# Patient Record
Sex: MALE | Race: OTHER | HISPANIC OR LATINO | URBAN - METROPOLITAN AREA
[De-identification: names, ages, dates, MRNs, and addresses within clinical notes are randomized per-mention and may not be internally consistent; named-entity substitution may affect disease eponyms.]

---

## 2024-07-14 ENCOUNTER — EMERGENCY (EMERGENCY)
Facility: HOSPITAL | Age: 24
LOS: 1 days | Discharge: HOME | End: 2024-07-14
Attending: EMERGENCY MEDICINE
Payer: COMMERCIAL

## 2024-07-14 VITALS
WEIGHT: 160.06 LBS | TEMPERATURE: 99 F | SYSTOLIC BLOOD PRESSURE: 115 MMHG | DIASTOLIC BLOOD PRESSURE: 64 MMHG | RESPIRATION RATE: 16 BRPM | OXYGEN SATURATION: 97 % | HEART RATE: 116 BPM | HEIGHT: 68 IN

## 2024-07-14 VITALS
SYSTOLIC BLOOD PRESSURE: 115 MMHG | HEART RATE: 90 BPM | OXYGEN SATURATION: 98 % | RESPIRATION RATE: 18 BRPM | TEMPERATURE: 98 F | DIASTOLIC BLOOD PRESSURE: 84 MMHG

## 2024-07-14 PROCEDURE — 71045 X-RAY EXAM CHEST 1 VIEW: CPT | Mod: 26

## 2024-07-14 PROCEDURE — 99283 EMERGENCY DEPT VISIT LOW MDM: CPT | Mod: 25

## 2024-07-14 PROCEDURE — 71045 X-RAY EXAM CHEST 1 VIEW: CPT

## 2024-07-14 PROCEDURE — 99284 EMERGENCY DEPT VISIT MOD MDM: CPT

## 2024-07-14 RX ORDER — SODIUM CHLORIDE 0.9 % (FLUSH) 0.9 %
1000 SYRINGE (ML) INJECTION ONCE
Refills: 0 | Status: COMPLETED | OUTPATIENT
Start: 2024-07-14 | End: 2024-07-14

## 2024-07-14 RX ADMIN — Medication 1000 MILLILITER(S): at 18:17

## 2024-07-14 NOTE — ED ADULT NURSE NOTE - SUICIDE SCREENING QUESTION 2
[facility] Care Coordination Contact      [facility] Six-Month Telephone Assessment    6 month telephone assessment completed on 5/12/21.    ER visits: No  Hospitalizations: No  TCU stays: No  Significant health status changes: Reported her pain is better managed. Discussed rt TENS unit not covered under [place] d/t member does not have qualifying diagnosis. Recommended to check online.   Falls/Injuries: No  ADL/IADL changes: No  Changes in services: No    Caregiver Assessment follow up: Has daughter for informal support.     Goals: See POC in chart for goal progress documentation.      Will see member in 6 months for an annual health risk assessment.   Encouraged member to call CC with any questions or concerns in the meantime.       Dileep Jorge RN Care Coordinator  [facility]  Office: [phone]  Fax: [phone]  [email or web address]                 No
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
## 2024-07-14 NOTE — ED PROVIDER NOTE - NS ED MD DISPO DISCHARGE CCDA
Visit Summary for NIYA LOPEZ - Gender: Male - Date of Birth: [id number]  Date: [id number] - Duration: 2 minutes  Patient: Coco Hansen   [address]  Provider: Howie Gayle PA-C    Patient Contact Information  Address  [address] [employer]  [place]; [address]  [phone]    Visit Topics  Comp spine acute LBP [Added By: Self - 2019-11-27]    Conversation Transcripts  [0A][0A] [Notification] You are connected with Howie Gayle PA-C, Urgent Care Specialist [0A][Notification] Kirsten Lackey is located in [place]  [0A][Notification] Kirsten Lackey has shared health history  Mane Jara  [0A]    Diagnosis    Procedures  Value: [id number] Code: CPT-4 UNLISTED E&M SERVICE    Medications Prescribed    No prescriptions ordered    Electronically signed by: Abdi Garsia PA-C(NPI [id number]) Patient/Caregiver provided printed discharge information.
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
## 2024-07-14 NOTE — ED PROVIDER NOTE - WET READ LAUNCH FT
EEG completed, report to follow.   Jaky Martino 7/5/2018 11:28 AM There are no Wet Read(s) to document.

## 2024-07-14 NOTE — ED PROVIDER NOTE - PATIENT PORTAL LINK FT
You can access the FollowMyHealth Patient Portal offered by [facility] by registering at the following website: [email or web address]. By joining [employer]’s FollowMyHealth portal, you will also be able to view your health information using other applications (apps) compatible with our system.

## 2024-07-14 NOTE — ED PROVIDER NOTE - PROGRESS NOTE DETAILS
Gina PIERRE: Received patient in signout pending [employer].  Vitals improved, chest x-ray within normal limits.  Patient feeling better.  Ready for discharge.
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
## 2024-07-14 NOTE — ED PROVIDER NOTE - NSFOLLOWUPINSTRUCTIONS_ED_ALL_ED_FT
increase fluid intake  follow up with doctor in 3 - 5 days increase fluid intake  follow up with doctor in 3 - 5 days    Nonfatal Drowning  Nonfatal drowning happens when you cannot breathe normally after being underwater or after breathing water into your lungs. Drowning stops you from getting enough oxygen.    Nonfatal drowning can be life-threatening. It can also cause lung damage, brain damage, and other serious medical problems. Symptoms of drowning can start right away or hours later. Still, many people do not get serious medical problems from drowning.    What are the causes?  Drowning may be caused by:  Panicking and holding your breath when you are underwater.  Breathing in water by accident.  Tightening (spasm) of muscles used for breathing.  What increases the risk?  Being younger than age 4 or older than age 75.  Not being able to swim or not being used to being in water.  Not having been in a boat often in the past.  Drinking alcohol near water.  Using certain drugs or medicines near water.  Having problems with your heart, lungs, or nervous system.  Having an accident or getting hurt in or near water.  Spending time in water in spring or summer, or on weekends.  What are the signs or symptoms?  Symptoms may include:  Coughing or trouble breathing. You may make high-pitched whistling sounds when you breathe (wheeze).  Injured neck, if you get hurt from a dive or a fall.  Chest pain.  Vomiting.  Being very tired (fatigue).  Skin turning cold, blue, or pale.  Feeling mixed up (confused).  Seizure.  Coma. This means being deeply unconscious.  All of these problems can lead to:  Sudden stopping of the heart.  Stopping of breathing.  Seizure.  Coma.  Lasting problems with the nervous system.  How is this treated?  Rescue breathing or CPR.  Breathing support.  Antibiotic medicines to prevent or treat lung infection (pneumonia).  Fluids through an IV tube.  Careful watching, if your symptoms are not serious.  Treatment or a stay at a hospital, if your symptoms are serious. Symptoms include:  Shortness of breath.  Vomiting.  Trouble staying awake.  Loss of consciousness.  Coma.  Sudden stopping of the heart.  Not being able to breathe.  Follow these instructions at home:  Take over-the-counter and prescription medicines only as told by your doctor.  If you were given an antibiotic medicine, take it as told by your doctor. Do not stop taking it even if you start to feel better.  Keep all follow-up visits.  How is this prevented?  Swimming safety      Learn to swim, if you cannot swim.  Do not do dangerous or risky activities near water.  Do not scuba-dive or swim in open ocean.  Swim only where lifeguards are around.  Do not swim alone. Always swim with a friend or family member.  Do not use foam toys or toys filled with air. These include water wings, foam noodles, and inner tubes. Use a life jacket instead.  Before you jump or dive into water, find out how deep the water is.  Avoid swimming in places where you do not know if the water is safe. Watch for currents, wildlife, rocks, and other dangers in bodies of water.  Put a fence around home pools, including any of these:  In-ground pools.  Above-ground pools.  Inflatable pools.  Portable pools.  General water safety      Always watch children around water. Stay within reach when they are near or in pools, lakes, bathtubs, or buckets with water.  Always wear a life jacket when you go on a boat.  Do not drink alcohol or use drugs when you are near bodies of water.  Do not walk, skate, or ride on thin or thawing ice in winter.  Contact a doctor if:  You have a fever.  You have a cough that will not go away.  Get help right away if:  You have trouble breathing.  You start to vomit.  You start to make high-pitched whistling sounds when you breathe.  You cough up bloody spit.  You feel afraid or feel like you cannot get enough air.  You start to sweat a lot.  Your skin turns blue or pale.  You are mixed up or have trouble staying awake.  You faint or lose consciousness.  These symptoms may be an emergency. Get help right away. Call your local emergency services (911 in the [place]  Do not wait to see if the symptoms will go away.  Do not drive yourself to the hospital.  Summary  Drowning happens when you cannot breathe normally after being underwater or after breathing water into your lungs. Drowning can be life-threatening. It can also cause serious medical problems. Still, many people do not get serious medical problems from drowning.  Symptoms of drowning may include breathing trouble, coughing, and making whistling sounds during breathing (wheezing).  You may need treatment with breathing support, fluids, medicines, and careful watching in the hospital.  This information is not intended to replace advice given to you by your health care provider. Make sure you discuss any questions you have with your health care provider.
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
